# Patient Record
Sex: FEMALE | Race: OTHER | Employment: UNEMPLOYED | URBAN - METROPOLITAN AREA
[De-identification: names, ages, dates, MRNs, and addresses within clinical notes are randomized per-mention and may not be internally consistent; named-entity substitution may affect disease eponyms.]

---

## 2017-01-04 ENCOUNTER — HOSPITAL ENCOUNTER (OUTPATIENT)
Dept: MAMMOGRAPHY | Age: 47
Discharge: HOME | End: 2017-01-04
Attending: OBSTETRICS & GYNECOLOGY
Payer: COMMERCIAL

## 2017-01-04 DIAGNOSIS — Z12.31 VISIT FOR SCREENING MAMMOGRAM: ICD-10-CM

## 2017-01-04 PROCEDURE — 77067 SCR MAMMO BI INCL CAD: CPT

## 2017-08-16 ENCOUNTER — HOSPITAL ENCOUNTER (OUTPATIENT)
Dept: NON INVASIVE DIAGNOSTICS | Age: 47
Discharge: HOME | End: 2017-08-16
Attending: FAMILY MEDICINE

## 2017-08-16 ENCOUNTER — APPOINTMENT (OUTPATIENT)
Dept: NON INVASIVE DIAGNOSTICS | Age: 47
End: 2017-08-16
Attending: FAMILY MEDICINE

## 2017-08-16 DIAGNOSIS — R07.9 CHEST PAIN: ICD-10-CM

## 2017-08-16 PROCEDURE — 93017 CV STRESS TEST TRACING ONLY: CPT

## 2017-08-16 PROCEDURE — 93306 TTE W/DOPPLER COMPLETE: CPT

## 2020-08-22 ENCOUNTER — HOSPITAL ENCOUNTER (EMERGENCY)
Age: 50
Discharge: HOME | End: 2020-08-22
Attending: EMERGENCY MEDICINE
Payer: SUBSIDIZED

## 2020-08-22 ENCOUNTER — APPOINTMENT (OUTPATIENT)
Dept: GENERAL RADIOLOGY | Age: 50
End: 2020-08-22
Attending: EMERGENCY MEDICINE
Payer: SUBSIDIZED

## 2020-08-22 VITALS
OXYGEN SATURATION: 99 % | TEMPERATURE: 98.9 F | RESPIRATION RATE: 22 BRPM | DIASTOLIC BLOOD PRESSURE: 75 MMHG | SYSTOLIC BLOOD PRESSURE: 156 MMHG | HEART RATE: 68 BPM

## 2020-08-22 DIAGNOSIS — R07.89 ATYPICAL CHEST PAIN: Primary | ICD-10-CM

## 2020-08-22 LAB
ALBUMIN SERPL-MCNC: 4.2 G/DL (ref 3.5–5)
ALBUMIN/GLOB SERPL: 1 {RATIO} (ref 1.2–3.5)
ALP SERPL-CCNC: 83 U/L (ref 50–136)
ALT SERPL-CCNC: 34 U/L (ref 12–65)
ANION GAP SERPL CALC-SCNC: 7 MMOL/L (ref 7–16)
AST SERPL-CCNC: 23 U/L (ref 15–37)
BASOPHILS # BLD: 0 K/UL (ref 0–0.2)
BASOPHILS NFR BLD: 0 % (ref 0–2)
BILIRUB SERPL-MCNC: 0.5 MG/DL (ref 0.2–1.1)
BUN SERPL-MCNC: 7 MG/DL (ref 6–23)
CALCIUM SERPL-MCNC: 8.8 MG/DL (ref 8.3–10.4)
CHLORIDE SERPL-SCNC: 105 MMOL/L (ref 98–107)
CO2 SERPL-SCNC: 26 MMOL/L (ref 21–32)
CREAT SERPL-MCNC: 0.98 MG/DL (ref 0.6–1)
D DIMER PPP FEU-MCNC: 0.37 UG/ML(FEU)
DIFFERENTIAL METHOD BLD: NORMAL
EOSINOPHIL # BLD: 0.1 K/UL (ref 0–0.8)
EOSINOPHIL NFR BLD: 1 % (ref 0.5–7.8)
ERYTHROCYTE [DISTWIDTH] IN BLOOD BY AUTOMATED COUNT: 12.7 % (ref 11.9–14.6)
GLOBULIN SER CALC-MCNC: 4.2 G/DL (ref 2.3–3.5)
GLUCOSE SERPL-MCNC: 260 MG/DL (ref 65–100)
HCT VFR BLD AUTO: 42.1 % (ref 35.8–46.3)
HGB BLD-MCNC: 14.2 G/DL (ref 11.7–15.4)
IMM GRANULOCYTES # BLD AUTO: 0 K/UL (ref 0–0.5)
IMM GRANULOCYTES NFR BLD AUTO: 0 % (ref 0–5)
LYMPHOCYTES # BLD: 2.3 K/UL (ref 0.5–4.6)
LYMPHOCYTES NFR BLD: 25 % (ref 13–44)
MCH RBC QN AUTO: 29.1 PG (ref 26.1–32.9)
MCHC RBC AUTO-ENTMCNC: 33.7 G/DL (ref 31.4–35)
MCV RBC AUTO: 86.3 FL (ref 79.6–97.8)
MONOCYTES # BLD: 0.4 K/UL (ref 0.1–1.3)
MONOCYTES NFR BLD: 5 % (ref 4–12)
NEUTS SEG # BLD: 6.1 K/UL (ref 1.7–8.2)
NEUTS SEG NFR BLD: 69 % (ref 43–78)
NRBC # BLD: 0 K/UL (ref 0–0.2)
PLATELET # BLD AUTO: 280 K/UL (ref 150–450)
PMV BLD AUTO: 10.7 FL (ref 9.4–12.3)
POTASSIUM SERPL-SCNC: 3.7 MMOL/L (ref 3.5–5.1)
PROT SERPL-MCNC: 8.4 G/DL (ref 6.3–8.2)
RBC # BLD AUTO: 4.88 M/UL (ref 4.05–5.2)
SODIUM SERPL-SCNC: 138 MMOL/L (ref 136–145)
TROPONIN-HIGH SENSITIVITY: 5.6 PG/ML (ref 0–14)
WBC # BLD AUTO: 8.9 K/UL (ref 4.3–11.1)

## 2020-08-22 PROCEDURE — 80053 COMPREHEN METABOLIC PANEL: CPT

## 2020-08-22 PROCEDURE — 99285 EMERGENCY DEPT VISIT HI MDM: CPT

## 2020-08-22 PROCEDURE — 71045 X-RAY EXAM CHEST 1 VIEW: CPT

## 2020-08-22 PROCEDURE — 85379 FIBRIN DEGRADATION QUANT: CPT

## 2020-08-22 PROCEDURE — 85025 COMPLETE CBC W/AUTO DIFF WBC: CPT

## 2020-08-22 PROCEDURE — 84484 ASSAY OF TROPONIN QUANT: CPT

## 2020-08-22 PROCEDURE — 93005 ELECTROCARDIOGRAM TRACING: CPT | Performed by: EMERGENCY MEDICINE

## 2020-08-22 RX ORDER — CYCLOBENZAPRINE HCL 5 MG
5 TABLET ORAL
Qty: 13 TAB | Refills: 0 | Status: SHIPPED | OUTPATIENT
Start: 2020-08-22

## 2020-08-23 LAB
ATRIAL RATE: 74 BPM
CALCULATED P AXIS, ECG09: 55 DEGREES
CALCULATED R AXIS, ECG10: 94 DEGREES
CALCULATED T AXIS, ECG11: 2 DEGREES
DIAGNOSIS, 93000: NORMAL
P-R INTERVAL, ECG05: 164 MS
Q-T INTERVAL, ECG07: 374 MS
QRS DURATION, ECG06: 92 MS
QTC CALCULATION (BEZET), ECG08: 415 MS
VENTRICULAR RATE, ECG03: 74 BPM

## 2020-08-23 NOTE — PROGRESS NOTES
present via phone for an encounter with Dr. Gisella Flowers      Thank you,          [address]  [phone] (phone)

## 2020-08-23 NOTE — DISCHARGE INSTRUCTIONS
Take medications as prescribed  Follow-up with the [facility]  Return to the ER for any new, worsening or life-threatening symptoms      Chest Pain: Care Instructions  Your Care Instructions     There are many things that can cause chest pain. Some are not serious and will get better on their own in a few days. But some kinds of chest pain need more testing and treatment. Your doctor may have recommended a follow-up visit in the next 8 to 12 hours. If you are not getting better, you may need more tests or treatment. Even though your doctor has released you, you still need to watch for any problems. The doctor carefully checked you, but sometimes problems can develop later. If you have new symptoms or if your symptoms do not get better, get medical care right away. If you have worse or different chest pain or pressure that lasts more than 5 minutes or you passed out (lost consciousness), [id number] or seek other emergency help right away. A medical visit is only one step in your treatment. Even if you feel better, you still need to do what your doctor recommends, such as going to all suggested follow-up appointments and taking medicines exactly as directed. This will help you recover and help prevent future problems. How can you care for yourself at home? · Rest until you feel better. · Take your medicine exactly as prescribed. Call your doctor if you think you are having a problem with your medicine. · Do not drive after taking a prescription pain medicine. When should you call for help? [id number]:   · You passed out (lost consciousness). · You have severe difficulty breathing. · You have symptoms of a heart attack. These may include:  ? Chest pain or pressure, or a strange feeling in your chest.  ? Sweating. ? Shortness of breath. ? Nausea or vomiting. ? Pain, pressure, or a strange feeling in your back, neck, jaw, or upper belly or in one or both shoulders or arms.   ? Lightheadedness or sudden weakness. ? A fast or irregular heartbeat. After you call 911, the  may tell you to chew 1 adult-strength or 2 to 4 low-dose aspirin. Wait for an ambulance. Do not try to drive yourself. Call your doctor today if:   · You have any trouble breathing. · Your chest pain gets worse. · You are dizzy or lightheaded, or you feel like you may faint. · You are not getting better as expected. · You are having new or different chest pain. Where can you learn more? Go to http://luis-marshall.info/  Enter A120 in the search box to learn more about \"Chest Pain: Care Instructions. \"  Current as of: June 26, 2019               Content Version: 12.5  © 9080-4224 [employer]. Care instructions adapted under license by [employer] (which disclaims liability or warranty for this information). If you have questions about a medical condition or this instruction, always ask your healthcare professional. [place] [address] any warranty or liability for your use of this information. Patient Education        Dolor de pecho musculoesquelético: Instrucciones de cuidado  Musculoskeletal Chest Pain: Care Instructions  Instrucciones de cuidado    El dolor de pecho no siempre es waldo señal de que haya algo phillip con parada corazón, o de que tenga algún otro problema grave de danielle. Parada médico piensa que parada dolor de pecho es causado por músculos o ligamentos forzados, inflamación del cartílago del pecho, o algún otro problema en el pecho y no en el corazón. Es posible que necesite más pruebas para determinar la causa del dolor de [place]. La atención de seguimiento es waldo parte clave de parada tratamiento y seguridad. Asegúrese de hacer y acudir a todas las citas, y llame a parada médico si está teniendo problemas. También es waldo buena idea saber los resultados de leslie exámenes y mantener waldo lista de los medicamentos que dennis. ¿Cómo puede cuidarse en el hogar?   · [employer] analgésicos (medicamentos para el dolor) exactamente parmjit le fueron indicados. ? Si el médico le recetó un analgésico, tómelo según las indicaciones. ? Si no está tomando un analgésico recetado, pregúntele a parada médico si puede conrad clarissa de [employer]. · Descanse y proteja la grisel adolorida. · Interrumpa, modifique o suspenda cualquier actividad que pudiera estar causándole el dolor. · Colóquese hielo o waldo compresa fría en la grisel adolorida vivienne 10 a 20 minutos cada vez. Trate de hacerlo cada 1 a 2 horas vivienne los siguientes 3 días (cuando esté despierto) o hasta que la hinchazón baje. Póngase un paño lane entre el hielo y la piel. · Después de 2 ó 3 días, aplíquese waldo toalla tibia o waldo almohadilla térmica a baja temperatura en la grisel adolorida. Algunos médicos sugieren que se alterne entre tratamientos con calor y frío. · No se envuelva ni se vende con cinta las costillas para sostenerlas. Laurier podría hacer que usted karen respiraciones más cortas, lo que podría aumentar parada riesgo de [employer]. · Las cremas [place], parmjit [place] o [place], podrían aliviar los músculos adoloridos. Siga las instrucciones del envase. · Siga las instrucciones de parada médico sobre el ejercicio. · El estiramiento y el masaje suaves podrían ayudarle a mejorarse más rápidamente. Estírese despacio hasta el punto antes de que comience el dolor y mantenga el estiramiento vivienne al menos 15 a 30 segundos. Karen esto 3 ó 4 veces al día. Karen estiramientos después de haberse aplicado calor. · A medida que parada dolor mejore, vuelva poco a poco a leslie actividades normales. Si el dolor [place], podría ser waldo señal de que necesita descansar vivienne [employer]. ¿Cuándo debe pedir ayuda? Llame [id number] en cualquier momento que considere que necesita atención de emergencia. Por ejemplo, llame si:  · Siente dolor u opresión en el pecho. Estos síntomas podrían estar acompañados de:  ? Sudoración.   ? Falta de aire.  ? Náuseas o vómito. ? Dolor que se extiende del pecho al chris, la [place], o hacia clarissa o ambos hombros o [id number]. ? Can [place]. ? Pulso rápido o irregular. Después de llamar al 911, mastique 1 aspirina para adultos. Espere waldo ambulancia. No trate de conducir usted mismo un automóvil. · Tiene dolor repentino en el pecho y falta de [place], o tose hermilo. Llame a nugent médico ahora mismo o busque atención médica inmediata si:  · Tiene cualquier dificultad para respirar. · El dolor en el pecho empeora. · Nugent dolor de pecho aparece constantemente con el ejercicio y se rosemary con el reposo. Preste especial atención a los cambios en nugent danielle y asegúrese de comunicarse con nugent médico si:  · Nugent dolor de pecho no mejora después de 1 semana. ¿Dónde puede encontrar más información en inglés? Acey Smaller a http://www.gray.com/  Shira Leavitt K467 en la búsqueda para aprender más acerca de \"Dolor de pecho musculoesquelético: Instrucciones de cuidado. \"  Revisado: 26 junio, 6045               SLGIVDF del contenido: 12.5  © 2006-2020 Healthwise, Incorporated. Las instrucciones de cuidado fueron adaptadas bajo licencia por [employer] (which disclaims liability or warranty for this information). Si usted tiene [place] afección médica o sobre estas instrucciones, siempre pregunte a nugent profesional de danielle. Healthwise, Incorporated niega toda garantía o responsabilidad por nugent uso de esta información.

## 2020-08-23 NOTE — ED PROVIDER NOTES
Patient presents to the ER via private vehicle for chest pain. Patient reports for the past couple days he has had some left-sided chest pain. Describes it as a sharp pain. States pain is made worse with palpation, movement and certain positions. Denies any associated nausea, vomiting or diaphoresis. Denies any recent cough. The history is provided by the patient. Chest Pain    This is a recurrent problem. The current episode started more than 2 days ago. The pain is present in the left side. The pain is at a severity of 4/10. The pain is moderate. The quality of the pain is described as sharp. The symptoms are aggravated by movement, deep breathing and palpation. Pertinent negatives include no abdominal pain, no back pain, no cough, no diaphoresis, no leg pain, no malaise/fatigue, no numbness, no orthopnea, no palpitations and no vomiting. She has tried nothing for the symptoms. No past medical history on file. No past surgical history on file. No family history on file.     Social History     Socioeconomic History    Marital status:      Spouse name: Not on file    Number of children: Not on file    Years of education: Not on file    Highest education level: Not on file   Occupational History    Not on file   Social Needs    Financial resource strain: Not on file    Food insecurity     Worry: Not on file     Inability: Not on file    Transportation needs     Medical: Not on file     Non-medical: Not on file   Tobacco Use    Smoking status: Never Smoker   Substance and Sexual Activity    Alcohol use: Not on file    Drug use: Not on file    Sexual activity: Not on file   Lifestyle    Physical activity     Days per week: Not on file     Minutes per session: Not on file    Stress: Not on file   Relationships    Social connections     Talks on phone: Not on file     Gets together: Not on file     Attends Spiritism service: Not on file     Active member of club or organization: Not on file     Attends meetings of clubs or organizations: Not on file     Relationship status: Not on file    Intimate partner violence     Fear of current or ex partner: Not on file     Emotionally abused: Not on file     Physically abused: Not on file     Forced sexual activity: Not on file   Other Topics Concern    Not on file   Social History Narrative    Not on file         ALLERGIES: Patient has no known allergies. Review of Systems   Constitutional: Negative for diaphoresis and malaise/fatigue. HENT: Negative for congestion. Eyes: Negative for photophobia and redness. Respiratory: Positive for chest tightness. Negative for cough. Cardiovascular: Positive for chest pain. Negative for palpitations and orthopnea. Gastrointestinal: Negative for abdominal pain and vomiting. Genitourinary: Negative for flank pain and urgency. Musculoskeletal: Negative for back pain and gait problem. Neurological: Negative for light-headedness and numbness. Hematological: Negative for adenopathy. Does not bruise/bleed easily. All other systems reviewed and are negative. Vitals:    08/22/20 2140   BP: 150/79   Pulse: 85   Resp: 23   SpO2: 98%            Physical Exam  Vitals signs and nursing note reviewed. Constitutional:       Appearance: Normal appearance. HENT:      Head: Normocephalic and atraumatic. Eyes:      Extraocular Movements: Extraocular movements intact. Conjunctiva/sclera: Conjunctivae normal.      Pupils: Pupils are equal, round, and reactive to light. Neck:      Musculoskeletal: Normal range of motion and neck supple. Cardiovascular:      Rate and Rhythm: Normal rate and regular rhythm. Pulses: Normal pulses. Heart sounds: Normal heart sounds. Pulmonary:      Effort: Pulmonary effort is normal.      Breath sounds: Normal breath sounds. Chest:      Chest wall: Tenderness present. Abdominal:      General: Abdomen is flat.  Bowel sounds are normal.      Palpations: Abdomen is soft. Skin:     General: Skin is warm and dry. Capillary Refill: Capillary refill takes less than 2 seconds. Neurological:      General: No focal deficit present. Mental Status: She is alert. MDM  Number of Diagnoses or Management Options  Diagnosis management comments: Chest pain is described as atypical in nature. She reports a history of coronary artery disease. We will try to review outside records. Obtain EKG, cardiac enzymes chest x-ray. We will also obtain d-dimer    10:53 PM  Negative d-dimer. Normal troponin  Negative EKG negative chest x-ray       Amount and/or Complexity of Data Reviewed  Clinical lab tests: ordered and reviewed  Tests in the radiology section of CPT®: ordered and reviewed  Independent visualization of images, tracings, or specimens: yes (EKG interpretation.:  Sinus rhythm, rate 85, normal axis, no acute ischemic changes)    Risk of Complications, Morbidity, and/or Mortality  Presenting problems: moderate  Diagnostic procedures: low  Management options: moderate    Patient Progress  Patient progress: stable         Procedures          Results Include:    Recent Results (from the past 24 hour(s))   CBC WITH AUTOMATED DIFF    Collection Time: 08/22/20  9:47 PM   Result Value Ref Range    WBC 8.9 4.3 - 11.1 K/uL    RBC 4.88 4.05 - 5.2 M/uL    HGB 14.2 11.7 - 15.4 g/dL    HCT 42.1 35.8 - 46.3 %    MCV 86.3 79.6 - 97.8 FL    MCH 29.1 26.1 - 32.9 PG    MCHC 33.7 31.4 - 35.0 g/dL    RDW 12.7 11.9 - 14.6 %    PLATELET 164 596 - 818 K/uL    MPV 10.7 9.4 - 12.3 FL    ABSOLUTE NRBC 0.00 0.0 - 0.2 K/uL    DF AUTOMATED      NEUTROPHILS 69 43 - 78 %    LYMPHOCYTES 25 13 - 44 %    MONOCYTES 5 4.0 - 12.0 %    EOSINOPHILS 1 0.5 - 7.8 %    BASOPHILS 0 0.0 - 2.0 %    IMMATURE GRANULOCYTES 0 0.0 - 5.0 %    ABS. NEUTROPHILS 6.1 1.7 - 8.2 K/UL    ABS. LYMPHOCYTES 2.3 0.5 - 4.6 K/UL    ABS. MONOCYTES 0.4 0.1 - 1.3 K/UL    ABS.  EOSINOPHILS 0.1 0.0 - 0.8 K/UL    ABS. BASOPHILS 0.0 0.0 - 0.2 K/UL    ABS. IMM. GRANS. 0.0 0.0 - 0.5 K/UL   METABOLIC PANEL, COMPREHENSIVE    Collection Time: 08/22/20  9:47 PM   Result Value Ref Range    Sodium 138 136 - 145 mmol/L    Potassium 3.7 3.5 - 5.1 mmol/L    Chloride 105 98 - 107 mmol/L    CO2 26 21 - 32 mmol/L    Anion gap 7 7 - 16 mmol/L    Glucose 260 (H) 65 - 100 mg/dL    BUN 7 6 - 23 MG/DL    Creatinine 0.98 0.6 - 1.0 MG/DL    GFR est AA >60 >60 ml/min/1.73m2    GFR est non-AA >60 >60 ml/min/1.73m2    Calcium 8.8 8.3 - 10.4 MG/DL    Bilirubin, total 0.5 0.2 - 1.1 MG/DL    ALT (SGPT) 34 12 - 65 U/L    AST (SGOT) 23 15 - 37 U/L    Alk. phosphatase 83 50 - 136 U/L    Protein, total 8.4 (H) 6.3 - 8.2 g/dL    Albumin 4.2 3.5 - 5.0 g/dL    Globulin 4.2 (H) 2.3 - 3.5 g/dL    A-G Ratio 1.0 (L) 1.2 - 3.5     D DIMER    Collection Time: 08/22/20  9:47 PM   Result Value Ref Range    D DIMER 0.37 <0.56 ug/ml(FEU)   TROPONIN-HIGH SENSITIVITY    Collection Time: 08/22/20  9:47 PM   Result Value Ref Range    Troponin-High Sensitivity 5.6 0 - 14 pg/mL     Voice dictation software was used during the making of this note. This software is not perfect and grammatical and other typographical errors may be present. This note has been proofread, but may still contain errors.   Yvonne Devi MD; 8/22/2020 @10:53 PM   ===================================================================

## 2020-08-23 NOTE — ED TRIAGE NOTES
Pt comes in c/o Chest pain/ pain around both lungs x 5 days, not being able to sleep last night, a warm sensation on her feet. Denied dyspnea, NVD, a fever, cough. 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 No pacemaker or heart stents .

## 2020-08-23 NOTE — ED NOTES
I have reviewed discharge instructions with the patient. The patient verbalized understanding. Patient left ED via Discharge Method: ambulatory to Home . Opportunity for questions and clarification provided. Patient given 1 scripts. To continue your aftercare when you leave the hospital, you may receive an automated call from our care team to check in on how you are doing. This is a free service and part of our promise to provide the best care and service to meet your aftercare needs.  If you have questions, or wish to unsubscribe from this service please call [phone]. Thank you for Choosing our [facility] Emergency Department.

## 2020-09-18 ENCOUNTER — HOSPITAL ENCOUNTER (OUTPATIENT)
Dept: NUCLEAR MEDICINE | Age: 50
Discharge: HOME | End: 2020-09-18
Attending: FAMILY MEDICINE

## 2020-09-18 DIAGNOSIS — R07.89 ATYPICAL CHEST PAIN: ICD-10-CM

## 2020-09-18 PROCEDURE — 78452 HT MUSCLE IMAGE SPECT MULT: CPT

## 2020-09-18 PROCEDURE — 93017 CV STRESS TEST TRACING ONLY: CPT

## 2020-09-18 RX ORDER — SODIUM CHLORIDE 0.9 % (FLUSH) 0.9 %
10 SYRINGE (ML) INJECTION
Status: ACTIVE | OUTPATIENT
Start: 2020-09-18 | End: 2020-09-18

## 2020-09-22 NOTE — PROCEDURES
[facility] MED CARDIAC STRESS    Name:  Gema Griggs  MR#:  [id number]  :  1970  ACCOUNT #:  [de-identified]  DATE OF SERVICE:  2020    REFERRING PHYSICIAN:  Aleks Delacruz MD    INDICATION:  Chest pain. Rest dose Myoview is 10.7 mCi, stress dose 31.5 mCi. PROTOCOL:  Danie exercise treadmill stress test.    VITALS AT REST:  Blood pressure 128/72, heart rate 62. The patient exercised according to the Danie protocol for 11 minutes 31 seconds achieving a work level of 13.4 METS. Resting heart rate tory to a maximal heart rate of 151 beats per minute representing 87% of maximal age predicted heart rate. There was no chest pain or abnormal dyspnea at peak stress. EKG at rest shows normal sinus rhythm and is essentially normal.  EKG at peak stress shows sinus tachycardia with nonspecific ST-T wave changes. There is no malignant arrhythmia noted and no significant ST or T-wave changes noted to suggest ischemia. The immediate recovery EKG shows nonspecific 1.5 mm of inferior ST-segment depression that is flat and gradually resolves. NUCLEAR IMAGING:  There is symmetric uptake of radiopharmaceutical throughout with no evidence for stress-induced ischemia. Gated imaging demonstrates symmetric left ventricular wall thickening throughout with a calculated ejection fraction of 69%. CONCLUSIONS:  1. Normal myocardial perfusion with no ischemia. 2.  Minor nonspecific stress-induced EKG changes inferiorly with excellent stress. 3.  Normal left ventricular regional wall motion and ejection fraction.         Vanessa Gonzalez MD      [id number]  D:  2020 15:47  T:  2020 3:22  JOB #:  [id number]  CC:  Aleks Delacruz MD       [facility] Card[facility]